# Patient Record
Sex: MALE | Race: WHITE | NOT HISPANIC OR LATINO
[De-identification: names, ages, dates, MRNs, and addresses within clinical notes are randomized per-mention and may not be internally consistent; named-entity substitution may affect disease eponyms.]

---

## 2020-02-20 ENCOUNTER — TRANSCRIPTION ENCOUNTER (OUTPATIENT)
Age: 40
End: 2020-02-20

## 2020-03-26 ENCOUNTER — APPOINTMENT (OUTPATIENT)
Dept: UROLOGY | Facility: CLINIC | Age: 40
End: 2020-03-26

## 2021-11-25 ENCOUNTER — APPOINTMENT (OUTPATIENT)
Dept: AFTER HOURS CARE | Facility: EMERGENCY ROOM | Age: 41
End: 2021-11-25
Payer: COMMERCIAL

## 2021-11-25 DIAGNOSIS — L08.9: ICD-10-CM

## 2021-11-25 DIAGNOSIS — S60.941A: ICD-10-CM

## 2021-11-25 DIAGNOSIS — I89.1 LYMPHANGITIS: ICD-10-CM

## 2021-11-25 PROCEDURE — 99203 OFFICE O/P NEW LOW 30 MIN: CPT | Mod: 95

## 2021-11-25 NOTE — PLAN
[By Private Vehicle] : Sent to Emergency Department by private vehicle [FreeTextEntry1] : Will send to ED for imaging, IV ABx and possibly observation

## 2021-11-25 NOTE — HISTORY OF PRESENT ILLNESS
[Home] : at home, [unfilled] , at the time of the visit. [Other Location: e.g. Home (Enter Location, City,State)___] : at [unfilled] [Verbal consent obtained from patient] : the patient, [unfilled] [FreeTextEntry8] : 41M, L hand dominant, no pmhx now p/w 1-2d worsening swelling of L index finger by the lateral aspect of distal IP joint, today becoming more swollen and almost "blister filled with blood" as well as redness tracking up hand past wrist. No hx of same. Pt bites his nails but otherwise cannot identify inciting event or other trauma. Pt st it hurts to flex his index finger. No sensory changes, motor loss, fevers, chills, immunocompromised conditions.  [Spouse] : spouse

## 2021-11-25 NOTE — ASSESSMENT
[FreeTextEntry1] : streaking in setting of obvious infection worrisome especialy of dominant index finger. Will send to ED for imaging, IV ABx and possibly observation

## 2023-03-05 ENCOUNTER — NON-APPOINTMENT (OUTPATIENT)
Age: 43
End: 2023-03-05